# Patient Record
Sex: MALE
[De-identification: names, ages, dates, MRNs, and addresses within clinical notes are randomized per-mention and may not be internally consistent; named-entity substitution may affect disease eponyms.]

---

## 2020-10-27 PROBLEM — Z00.00 ENCOUNTER FOR PREVENTIVE HEALTH EXAMINATION: Status: ACTIVE | Noted: 2020-10-27

## 2020-12-09 ENCOUNTER — APPOINTMENT (OUTPATIENT)
Dept: OTOLARYNGOLOGY | Facility: CLINIC | Age: 70
End: 2020-12-09
Payer: MEDICARE

## 2020-12-09 VITALS
DIASTOLIC BLOOD PRESSURE: 77 MMHG | WEIGHT: 170 LBS | HEIGHT: 70 IN | BODY MASS INDEX: 24.34 KG/M2 | SYSTOLIC BLOOD PRESSURE: 131 MMHG | TEMPERATURE: 97.5 F | HEART RATE: 78 BPM

## 2020-12-09 DIAGNOSIS — H90.A32 MIXED CONDUCTIVE AND SENSORINEURAL HEARING, UNILATERAL, LEFT EAR WITH RESTRICTED HEARING ON THE  CONTRALATERAL SIDE: ICD-10-CM

## 2020-12-09 DIAGNOSIS — H93.13 TINNITUS, BILATERAL: ICD-10-CM

## 2020-12-09 DIAGNOSIS — H90.3 SENSORINEURAL HEARING LOSS, BILATERAL: ICD-10-CM

## 2020-12-09 DIAGNOSIS — H93.293 OTHER ABNORMAL AUDITORY PERCEPTIONS, BILATERAL: ICD-10-CM

## 2020-12-09 DIAGNOSIS — Z78.9 OTHER SPECIFIED HEALTH STATUS: ICD-10-CM

## 2020-12-09 PROCEDURE — 92557 COMPREHENSIVE HEARING TEST: CPT

## 2020-12-09 PROCEDURE — 99072 ADDL SUPL MATRL&STAF TM PHE: CPT

## 2020-12-09 PROCEDURE — 92567 TYMPANOMETRY: CPT

## 2020-12-09 PROCEDURE — 99203 OFFICE O/P NEW LOW 30 MIN: CPT | Mod: 25

## 2020-12-09 RX ORDER — SIMVASTATIN 10 MG/1
10 TABLET, FILM COATED ORAL
Refills: 0 | Status: ACTIVE | COMMUNITY

## 2020-12-09 RX ORDER — LISINOPRIL 10 MG/1
10 TABLET ORAL
Refills: 0 | Status: ACTIVE | COMMUNITY

## 2020-12-09 NOTE — CONSULT LETTER
[FreeTextEntry2] : Michael Ladinsky, DO [FreeTextEntry1] : Dear Dr. Ladinsky,\par \par Thank you for referring Sae Stevens for evaluation of his tinnitus and hearing loss.  As you know, he is a pleasant 69-year-old gentleman with longstanding gradually worsening hearing loss and bothersome tinnitus.  An audiogram today shows a bilateral moderate to severe sensorineural hearing loss with asymmetric speech discrimination.  \par \par Today in the office I discussed with Mr. Stevens the association of his hearing loss with tinnitus, and also how tinnitus can worsen with external factors such as stress, lack of sleep, and stimulants such as caffeine.  He is already wearing hearing aids with built-in noise generators consistently, and in general I feel these are the most effective treatments for his tinnitus.  I recommended that he continue to work with his audiologist to find a noise generator program that is both comfortable for him and still effective in dampening the tinnitus.  Because of the asymmetry in his speech discrimination, I recommended an MRI to confirm the absence of vestibular schwannoma or other retrocochlear pathology.  Lastly, while medications are generally not effective for tinnitus, we did discuss lipoflavonoids and N-acetylcysteine as anecdotal treatments which sometimes have effectiveness in tinnitus reduction.\par \par Thank you very much for the opportunity participate in your patient's care.\par \par Best regards,\par \par Natan Jain MD\par Otology/Neurotology\par [facility]\par [facility]

## 2020-12-09 NOTE — DATA REVIEWED
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 positive middle ear pressure\par type A tymp AU\par Asymmetric word discrimination, worse on the left

## 2020-12-09 NOTE — REVIEW OF SYSTEMS
[Ear Pain] : ear pain [Ear Itch] : ear itch [Hearing Loss] : hearing loss [Ear Noises] : ear noises [Joint Pain] : joint pain [Negative] : Heme/Lymph

## 2020-12-09 NOTE — PHYSICAL EXAM
[Rinne Test Air Conduction Persists > Bone Conduction Right] : air conduction greater than bone conduction on the right [Rinne Test Air Conduction Persists > Bone Conduction Left] : air conduction greater than bone conduction on the left [Hearing Joseph Test (Tuning Fork On Forehead)] : no lateralization of tone [Normal] : no rashes [Hearing Loss Right Only] : normal [Hearing Loss Left Only] : normal

## 2020-12-09 NOTE — HISTORY OF PRESENT ILLNESS
[de-identified] : 69M presents today for bothersome tinnitus.  Has long standing Hx of SNHL and has needed HA's for years. Never wanted HA's but about 5 months ago the tinnitus got so bad he finally got the HA's.  Pt. states that the Aids have not helped the tinnitus.   Looking for other options.  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 Never had surgery on the ears, otalgia, otorrhea, headaches related to hearing loss or dizziness.  Father has hearing loss.

## 2021-01-06 ENCOUNTER — NON-APPOINTMENT (OUTPATIENT)
Age: 71
End: 2021-01-06